# Patient Record
Sex: FEMALE | Race: WHITE | NOT HISPANIC OR LATINO
[De-identification: names, ages, dates, MRNs, and addresses within clinical notes are randomized per-mention and may not be internally consistent; named-entity substitution may affect disease eponyms.]

---

## 2021-01-15 PROBLEM — Z00.00 ENCOUNTER FOR PREVENTIVE HEALTH EXAMINATION: Status: ACTIVE | Noted: 2021-01-15

## 2021-01-21 ENCOUNTER — APPOINTMENT (OUTPATIENT)
Dept: SURGERY | Facility: CLINIC | Age: 26
End: 2021-01-21
Payer: MEDICAID

## 2021-01-21 VITALS
WEIGHT: 155 LBS | BODY MASS INDEX: 27.46 KG/M2 | SYSTOLIC BLOOD PRESSURE: 113 MMHG | DIASTOLIC BLOOD PRESSURE: 80 MMHG | OXYGEN SATURATION: 97 % | HEIGHT: 63 IN | HEART RATE: 79 BPM

## 2021-01-21 VITALS — TEMPERATURE: 98.7 F

## 2021-01-21 DIAGNOSIS — Z78.9 OTHER SPECIFIED HEALTH STATUS: ICD-10-CM

## 2021-01-21 DIAGNOSIS — Z82.49 FAMILY HISTORY OF ISCHEMIC HEART DISEASE AND OTHER DISEASES OF THE CIRCULATORY SYSTEM: ICD-10-CM

## 2021-01-21 DIAGNOSIS — N63.31 UNSPECIFIED LUMP IN AXILLARY TAIL OF THE RIGHT BREAST: ICD-10-CM

## 2021-01-21 PROCEDURE — 99072 ADDL SUPL MATRL&STAF TM PHE: CPT

## 2021-01-21 PROCEDURE — 99204 OFFICE O/P NEW MOD 45 MIN: CPT

## 2021-01-21 NOTE — PAST MEDICAL HISTORY
[unknown] : the patient is unsure of the date of her LMP [Regular Cycle Intervals] : have been regular [Total Preg ___] : G[unfilled] [Menstruating] : The patient is menstruating [FreeTextEntry6] : n/a [FreeTextEntry7] : tri sprentec [FreeTextEntry8] : n/a

## 2021-01-21 NOTE — HISTORY OF PRESENT ILLNESS
[FreeTextEntry1] : 25 year old white female c/o swelling in right axilla for one year with pain and itching. She denies any breast masses or tenderness or nipple discharge. No family history of breast cancer. She has had one breast biopsy

## 2021-01-21 NOTE — PHYSICAL EXAM
[Normocephalic] : normocephalic [Atraumatic] : atraumatic [EOMI] : extra ocular movement intact [PERRL] : pupils equal, round and reactive to light [Sclera nonicteric] : sclera nonicteric [Supple] : supple [No Supraclavicular Adenopathy] : no supraclavicular adenopathy [No Cervical Adenopathy] : no cervical adenopathy [Normal Sinus Rhythm] : normal sinus rhythm [Clear to Auscultation Bilat] : clear to auscultation bilaterally [Examined in the supine and seated position] : examined in the supine and seated position [Symmetrical] : symmetrical [No Axillary Lymphadenopathy] : no left axillary lymphadenopathy [Soft] : abdomen soft [Not Tender] : non-tender [Normal Bowel Sounds] : normal bowel sounds  [No Edema] : no edema [No Rashes] : no rashes [No Ulceration] : no ulceration [de-identified] : increase axillary tissue on right [de-identified] : increase axillary tissue

## 2022-05-22 ENCOUNTER — APPOINTMENT (OUTPATIENT)
Dept: ORTHOPEDIC SURGERY | Facility: CLINIC | Age: 27
End: 2022-05-22
Payer: MEDICAID

## 2022-05-22 ENCOUNTER — TRANSCRIPTION ENCOUNTER (OUTPATIENT)
Age: 27
End: 2022-05-22

## 2022-05-22 PROCEDURE — 99204 OFFICE O/P NEW MOD 45 MIN: CPT

## 2022-05-22 PROCEDURE — 73110 X-RAY EXAM OF WRIST: CPT | Mod: LT

## 2022-05-23 RX ORDER — NORGESTIMATE AND ETHINYL ESTRADIOL 7DAYSX3 28
0.18/0.215/0.25 KIT ORAL
Refills: 0 | Status: ACTIVE | COMMUNITY

## 2022-05-23 NOTE — PHYSICAL EXAM
[Left] : left hand [1st Finger] : 1st finger [Dorsal Wrist] : dorsal wrist [1st] : 1st [MCP Joint] : MCP joint [Wrist Dorsiflexion] : wrist dorsiflexion [Wrist Volarflexion] : wrist volarflexion [Radial Deviation] : radial deviation [Ulnar Deviation] : ulnar deviation [Finger Flexion] : finger flexion [Finger Extension] : finger extension [] : palpable radial pulse [Fracture] : Fracture [FreeTextEntry8] : nondisp hamate fx

## 2022-05-23 NOTE — DISCUSSION/SUMMARY
[de-identified] : The patient was advised of the diagnosis. The natural history of the pathology was explained in full to the patient in layman's terms. All questions were answered. The risks and benefits of surgical and non-surgical treatment alternatives were explained in full to the patient.\par \par I explained to the patient that OTC pain medication, ice, elevation, compression and rest would benefit them.\par \par Thumb spica brace. MRI to eval for UCL tear.

## 2022-05-23 NOTE — REASON FOR VISIT
[FreeTextEntry2] : pt is a 27 yr old F with left hand/wrist pain since may 21 2022;. 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

## 2022-05-23 NOTE — REASON FOR VISIT
[FreeTextEntry2] : pt is a 27 yr old F with left hand/wrist pain since may 21 2022;. 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

## 2022-05-23 NOTE — DISCUSSION/SUMMARY
[de-identified] : The patient was advised of the diagnosis. The natural history of the pathology was explained in full to the patient in layman's terms. All questions were answered. The risks and benefits of surgical and non-surgical treatment alternatives were explained in full to the patient.\par \par I explained to the patient that OTC pain medication, ice, elevation, compression and rest would benefit them.\par \par Thumb spica brace. MRI to eval for UCL tear.

## 2022-05-26 ENCOUNTER — NON-APPOINTMENT (OUTPATIENT)
Age: 27
End: 2022-05-26

## 2022-05-30 ENCOUNTER — FORM ENCOUNTER (OUTPATIENT)
Age: 27
End: 2022-05-30

## 2022-05-31 ENCOUNTER — APPOINTMENT (OUTPATIENT)
Dept: MRI IMAGING | Facility: CLINIC | Age: 27
End: 2022-05-31
Payer: MEDICAID

## 2022-05-31 PROCEDURE — 73218 MRI UPPER EXTREMITY W/O DYE: CPT | Mod: LT

## 2022-06-02 ENCOUNTER — APPOINTMENT (OUTPATIENT)
Dept: ORTHOPEDIC SURGERY | Facility: CLINIC | Age: 27
End: 2022-06-02
Payer: MEDICAID

## 2022-06-02 VITALS — HEIGHT: 63 IN | BODY MASS INDEX: 25.69 KG/M2 | WEIGHT: 145 LBS

## 2022-06-02 PROCEDURE — 73110 X-RAY EXAM OF WRIST: CPT | Mod: LT

## 2022-06-02 PROCEDURE — 99214 OFFICE O/P EST MOD 30 MIN: CPT

## 2022-06-02 NOTE — PHYSICAL EXAM
[Left] : left wrist [The fracture is in acceptable alignment. There is progression in healing seen] : The fracture is in acceptable alignment. There is progression in healing seen [5th] : 5th [CMC Joint] : CMC joint [FreeTextEntry3] : MIld 5th CMC swelling [de-identified] : Thumb MP tenderness.  Stable to ulnar and radial stress

## 2022-06-02 NOTE — DATA REVIEWED
[MRI] : MRI [Left] : left [Hand] : hand [I reviewed the films/CD and agree] : I reviewed the films/CD and agree [FreeTextEntry1] : 1. Partial tear of the volar plate at the first metacarpal insertion with contusion of the dorsal and radial first \par metacarpal head and first metacarpophalangeal joint effusion. No collateral ligament tear.\par 2. Peritendinous edema of the flexor hallucis.\par 3. Contusion of the thenar muscles.

## 2022-06-02 NOTE — HISTORY OF PRESENT ILLNESS
[Sudden] : sudden [3] : 3 [6] : 6 [Constant] : constant [Household chores] : household chores [Work] : work [Sleep] : sleep [Meds] : meds [de-identified] : 27 yr old F with left hand/wrist pain since may 21 2022;. 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 Sen by MONALISA Jarrett on 2/22/22 and was sent for MRI. Dx with Hamate fracture of left wrist and MCP Sprain of left thumb. THumb spica splinting. 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

## 2022-06-22 ENCOUNTER — APPOINTMENT (OUTPATIENT)
Dept: ORTHOPEDIC SURGERY | Facility: CLINIC | Age: 27
End: 2022-06-22
Payer: MEDICAID

## 2022-06-22 VITALS — WEIGHT: 145 LBS | BODY MASS INDEX: 25.69 KG/M2 | HEIGHT: 63 IN

## 2022-06-22 PROCEDURE — 99214 OFFICE O/P EST MOD 30 MIN: CPT

## 2022-06-22 NOTE — ASSESSMENT
[FreeTextEntry1] : The condition was explained to the patient.\par - continue thumb spica brace, full time except hygiene and ROM exercises x 2wks, then d/c.\par - OTC pain medication, ice PRN.\par - light activity.\par \par F/u 3wks. X-rays L wrist.

## 2022-06-22 NOTE — HISTORY OF PRESENT ILLNESS
[4] : 4 [0] : 0 [Dull/Aching] : dull/aching [Localized] : localized [Sharp] : sharp [Intermittent] : intermittent [Full time] : Work status: full time [de-identified] : 6/22/22: 26yo RHD F () presents for LEFT hand/wrist. 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 Herring 6/2/22 to review results of MRI.\par Taking Aleve PRN.\par \par MRI L thumb 5/31/22 - IMPRESSION:\par 1. Partial tear of the volar plate at the first metacarpal insertion with contusion of the dorsal and radial first \par metacarpal head and first metacarpophalangeal joint effusion. No collateral ligament tear.\par 2. Peritendinous edema of the flexor hallucis.\par 3. Contusion of the thenar muscles.\par \par Hx: none. 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 Osorio [de-identified] : movement

## 2022-06-22 NOTE — IMAGING
[Left] : left wrist [de-identified] : LEFT HAND\par skin intact. mild swelling dorsal distal forearm.\par TTP to thumb volar > ulnar MPJ, hamate fx.\par good wrist extension, limited flexion. good pronation, supination.\par good EPL, FPL, opposition to SF PIPJ. good finger extension, flex to full fist. good finger abduction and adduction. \par SILT to median, ulnar, radial distribution. \par palpable radial pulse, brisk cap refill all digits.\par no triggering.\par \par thumb MPJ stress:\par - RCL: no pain, no instability.\par - UCL: + pain, no instability at neutral and 30deg flex. [FreeTextEntry8] : hamate body fx at 5th CMCJ with mild displacement.

## 2022-07-06 ENCOUNTER — APPOINTMENT (OUTPATIENT)
Dept: ORTHOPEDIC SURGERY | Facility: CLINIC | Age: 27
End: 2022-07-06

## 2022-07-13 ENCOUNTER — APPOINTMENT (OUTPATIENT)
Dept: ORTHOPEDIC SURGERY | Facility: CLINIC | Age: 27
End: 2022-07-13

## 2022-07-13 VITALS — HEIGHT: 63 IN | WEIGHT: 145 LBS | BODY MASS INDEX: 25.69 KG/M2

## 2022-07-13 DIAGNOSIS — S62.145A NONDISPLACED FRACTURE OF BODY OF HAMATE [UNCIFORM] BONE, LEFT WRIST, INITIAL ENCOUNTER FOR CLOSED FRACTURE: ICD-10-CM

## 2022-07-13 DIAGNOSIS — S63.642A SPRAIN OF METACARPOPHALANGEAL JOINT OF LEFT THUMB, INITIAL ENCOUNTER: ICD-10-CM

## 2022-07-13 PROCEDURE — 73110 X-RAY EXAM OF WRIST: CPT | Mod: LT

## 2022-07-13 PROCEDURE — 99213 OFFICE O/P EST LOW 20 MIN: CPT

## 2022-07-13 NOTE — HISTORY OF PRESENT ILLNESS
[2] : 2 [1] : 2 [de-identified] : 7/13/22: \par - 7.5wks s/p LEFT hamate body fx. doing well, no pain.\par - f/u LEFT thumb sprain. c/o soreness with activity and end ROM.\par \par 6/22/22: 28yo RHD F () presents for LEFT hand/wrist. 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 Herring 6/2/22 to review results of MRI.\par Taking Aleve PRN.\par \par MRI L thumb 5/31/22 - IMPRESSION:\par 1. Partial tear of the volar plate at the first metacarpal insertion with contusion of the dorsal and radial first \par metacarpal head and first metacarpophalangeal joint effusion. No collateral ligament tear.\par 2. Peritendinous edema of the flexor hallucis.\par 3. Contusion of the thenar muscles.\par \par Hx: none. [FreeTextEntry1] : left thumb and wrist  [FreeTextEntry5] : pain level is  better  [de-identified] : home exercises

## 2022-07-13 NOTE — IMAGING
[Left] : left wrist [de-identified] : LEFT HAND\par skin intact. no swelling.\par no TTP.\par good wrist extension, mild limited flexion. good pronation, supination.\par good EPL, FPL, opposition to SF MPJ. good finger extension, flex to full fist. good finger abduction and adduction. \par SILT to median, ulnar, radial distribution. \par palpable radial pulse, brisk cap refill all digits.\par no triggering.\par \par thumb MPJ stress:\par - RCL: no pain, no instability.\par - UCL: no pain, no instability at neutral and 30deg flex. [FreeTextEntry8] : hamate body fx at 5th CMCJ with mild displacement.

## 2022-08-24 ENCOUNTER — APPOINTMENT (OUTPATIENT)
Dept: ORTHOPEDIC SURGERY | Facility: CLINIC | Age: 27
End: 2022-08-24

## 2023-06-04 ENCOUNTER — NON-APPOINTMENT (OUTPATIENT)
Age: 28
End: 2023-06-04

## 2024-02-15 ENCOUNTER — OFFICE (OUTPATIENT)
Dept: URBAN - METROPOLITAN AREA CLINIC 112 | Facility: CLINIC | Age: 29
Setting detail: OPHTHALMOLOGY
End: 2024-02-15
Payer: COMMERCIAL

## 2024-02-15 DIAGNOSIS — S05.02XA: ICD-10-CM

## 2024-02-15 PROCEDURE — 92004 COMPRE OPH EXAM NEW PT 1/>: CPT | Performed by: PHYSICIAN ASSISTANT

## 2024-02-15 ASSESSMENT — REFRACTION_AUTOREFRACTION
OS_SPHERE: UTP
OD_SPHERE: UTP

## 2024-02-15 ASSESSMENT — CONFRONTATIONAL VISUAL FIELD TEST (CVF)
OS_FINDINGS: FULL
OD_FINDINGS: FULL

## 2024-02-15 ASSESSMENT — CORNEAL TRAUMA - ABRASION: OS_ABRASION: PRESENT

## 2024-12-12 ENCOUNTER — NON-APPOINTMENT (OUTPATIENT)
Age: 29
End: 2024-12-12

## 2025-02-16 ENCOUNTER — NON-APPOINTMENT (OUTPATIENT)
Age: 30
End: 2025-02-16

## 2025-05-18 ENCOUNTER — NON-APPOINTMENT (OUTPATIENT)
Age: 30
End: 2025-05-18